# Patient Record
Sex: MALE
[De-identification: names, ages, dates, MRNs, and addresses within clinical notes are randomized per-mention and may not be internally consistent; named-entity substitution may affect disease eponyms.]

---

## 2021-03-02 PROBLEM — Z00.00 ENCOUNTER FOR PREVENTIVE HEALTH EXAMINATION: Status: ACTIVE | Noted: 2021-03-02

## 2021-03-04 ENCOUNTER — APPOINTMENT (OUTPATIENT)
Dept: CARDIOLOGY | Facility: CLINIC | Age: 31
End: 2021-03-04
Payer: SELF-PAY

## 2021-03-04 ENCOUNTER — NON-APPOINTMENT (OUTPATIENT)
Age: 31
End: 2021-03-04

## 2021-03-04 VITALS
DIASTOLIC BLOOD PRESSURE: 77 MMHG | OXYGEN SATURATION: 97 % | HEIGHT: 67 IN | WEIGHT: 212 LBS | BODY MASS INDEX: 33.27 KG/M2 | HEART RATE: 69 BPM | SYSTOLIC BLOOD PRESSURE: 121 MMHG

## 2021-03-04 DIAGNOSIS — Z20.822 CONTACT WITH AND (SUSPECTED) EXPOSURE TO COVID-19: ICD-10-CM

## 2021-03-04 DIAGNOSIS — R06.2 WHEEZING: ICD-10-CM

## 2021-03-04 PROCEDURE — 99202 OFFICE O/P NEW SF 15 MIN: CPT

## 2021-03-04 NOTE — ASSESSMENT
[FreeTextEntry1] : Jose Alfredo Lew has an elevated BMI and history of wheezing but is not wheezing today.  The etiology of the wheezing is unclear but is not cardiac related.  His EKG and physical exam are normal except for his abdominal obesity.  Blood pressure is well controlled.  There are no cardiac issues at the present time

## 2021-03-04 NOTE — HISTORY OF PRESENT ILLNESS
[FreeTextEntry1] : In brief this is a healthy 30-year-old without diabetes hypertension non-smoker.  He has gained a significant amount of weight and his BMI is now almost 35.  He developed COVID-19 but has not developed antibodies.  He recovered without residual\par \par He recently has noted some wheezing which prompted a cardiac evaluation today.  He denies significant shortness of breath but has had some intermittent wheezing.  There is no chest pain palpitations dizziness or syncope\par \par EKG in the past and today is within normal limits normal [place] QRS interval

## 2021-03-04 NOTE — REASON FOR VISIT
[FreeTextEntry1] : Jose Alfredo Lew 30 years old here for evaluation of his cardiac status and elevated BMI

## 2021-03-04 NOTE — DISCUSSION/SUMMARY
[FreeTextEntry1] : I reassured him that his cardiac status was stable.  His BMI is quite elevated and I urged him to lose weight decrease his caloric intake and exercise on a regular basis\par \par At this point no further cardiac work-up is needed

## 2021-03-04 NOTE — PHYSICAL EXAM
[Normal Appearance] : normal appearance [General Appearance - Well Nourished] : well nourished [Normal Conjunctiva] : the conjunctiva exhibited no abnormalities [No Jugular Venous Quick A Waves] : no jugular venous quick A waves [Heart Sounds] : normal S1 and S2 [Murmurs] : no murmurs present [Arterial Pulses Normal] : the arterial pulses were normal [Respiration, Rhythm And Depth] : normal respiratory rhythm and effort [Auscultation Breath Sounds / Voice Sounds] : lungs were clear to auscultation bilaterally [Abdomen Soft] : soft [Abdomen Tenderness] : non-tender [Cyanosis, Localized] : no localized cyanosis [FreeTextEntry1] : No edema pulses 2+ dorsalis pedis